# Patient Record
Sex: MALE | Race: OTHER
[De-identification: names, ages, dates, MRNs, and addresses within clinical notes are randomized per-mention and may not be internally consistent; named-entity substitution may affect disease eponyms.]

---

## 2018-01-18 ENCOUNTER — HOSPITAL ENCOUNTER (EMERGENCY)
Dept: HOSPITAL 72 - EMR | Age: 33
Discharge: HOME | End: 2018-01-18
Payer: SELF-PAY

## 2018-01-18 VITALS — WEIGHT: 150 LBS | HEIGHT: 65 IN | BODY MASS INDEX: 24.99 KG/M2

## 2018-01-18 VITALS — DIASTOLIC BLOOD PRESSURE: 82 MMHG | SYSTOLIC BLOOD PRESSURE: 132 MMHG

## 2018-01-18 VITALS — SYSTOLIC BLOOD PRESSURE: 132 MMHG | DIASTOLIC BLOOD PRESSURE: 82 MMHG

## 2018-01-18 DIAGNOSIS — J32.9: Primary | ICD-10-CM

## 2018-01-18 PROCEDURE — 99283 EMERGENCY DEPT VISIT LOW MDM: CPT

## 2018-01-19 NOTE — EMERGENCY ROOM REPORT
History of Present Illness


General


Chief Complaint:  Flu Like Symptoms


Source:  Patient





Present Illness


HPI


32-year-old male presents ED for evaluation.  States he has trouble breathing 

from his nose.  Also complaining of flulike symptoms for the last month.  

States his left nostril feels clogged.  Feels congested with sinus pressure.  

Denies any sore throat or earache.  States he feels feverish.  Afebrile at 

triage.  Denies sick contacts recent travel.  No other aggravating relieving 

factors.  Denies any other associated symptoms


Allergies:  


Coded Allergies:  


     No Known Allergies (Unverified , 1/18/18)





Patient History


Past Medical History:  none


Past Surgical History:  none


Pertinent Family History:  none


Social History:  Denies: smoking, alcohol use, drug use


Immunizations:  UTD


Reviewed Nursing Documentation:  PMH: Agreed, PSxH: Agreed





Nursing Documentation-PMH


Past Medical History:  No Stated History





Review of Systems


All Other Systems:  negative except mentioned in HPI





Physical Exam





Vital Signs








  Date Time  Temp Pulse Resp B/P (MAP) Pulse Ox O2 Delivery O2 Flow Rate FiO2


 


1/18/18 19:38 98.8 98 16 132/82 95 Room Air  


 


1/18/18 19:46        95








Sp02 EP Interpretation:  reviewed, normal


General Appearance:  no apparent distress, alert, GCS 15, non-toxic


Head:  normocephalic, atraumatic


Eyes:  bilateral eye normal inspection, bilateral eye PERRL


ENT:  hearing grossly normal, normal pharynx, no angioedema, normal voice, TMs 

+ canals normal, other - narrow nasal turbinates


Neck:  full range of motion, supple/symm/no masses


Respiratory:  chest non-tender, lungs clear, normal breath sounds, speaking 

full sentences


Cardiovascular #1:  regular rate, rhythm, no edema


Cardiovascular #2:  2+ carotid (R), 2+ carotid (L), 2+ radial (R), 2+ radial (L)

, 2+ dorsalis pedis (R), 2+ dorsalis pedis (L)


Gastrointestinal:  normal bowel sounds, non tender, soft, non-distended, no 

guarding, no rebound


Rectal:  deferred


Genitourinary:  normal inspection, no CVA tenderness


Musculoskeletal:  back normal, gait/station normal, normal range of motion, non-

tender


Neurologic:  alert, oriented x3, responsive, motor strength/tone normal, 

sensory intact, speech normal


Psychiatric:  judgement/insight normal, memory normal, mood/affect normal, no 

suicidal/homicidal ideation


Reflexes:  3+ bicep (R), 3+ bicep (L), 3+ tricep (R), 3+ tricep (L), 3+ knee (R)

, 3+ knee (L)


Skin:  normal color, no rash, warm/dry, well hydrated


Lymphatic:  no adenopathy





Medical Decision Making


Diagnostic Impression:  


 Primary Impression:  


 Sinusitis


 Qualified Codes:  J01.10 - Acute frontal sinusitis, unspecified


ER Course


Hospital Course 


32-year-old M presents to ED complaining of nasal congestion. bodyaches, chills 

x 1 month





Differential diagnoses include: URI, pharyngitis, otitis media, asthma 





Clinical course


Patient placed on stretcher.  After initial history, physical exam reveals a 

young male in no acute distress.  Bilateral TM unremarkable.  No pharyngeal 

erythema.  No tonsillar exudates. narrow nasal turbinates. No lymphadenopathy.  

lungs clear. abdomen soft.   Clinical findings consistent with sinusitits.  





Given one month course we will prescribe antibiotics.  Recommend Flonase





Diagnosis - sinusitits





Stable and discharged home with prescriptions for Flonase, Amoxicillin.  

Instructed to followup with PMD.  Return to ED if symptoms recur or worsen





Last Vital Signs








  Date Time  Temp Pulse Resp B/P (MAP) Pulse Ox O2 Delivery O2 Flow Rate FiO2


 


1/18/18 20:18 98.8 98 16 132/82 95 Room Air  95








Status:  improved


Disposition:  HOME, SELF-CARE


Condition:  Stable


Scripts


Amoxicillin* (AMOXIL*) 500 Mg Capsule


500 MG ORAL THREE TIMES A DAY, #21 CAP


   Prov: DARCY OLYOLA M.D.         1/18/18 


Fluticasone Propionate (Flonase Allergy Relief) 9.9 Ml [email or web address]


9.9 ML NS BID for 10 Days, #9.9 ML


   Prov: DARCY LOYOLA M.D.         1/18/18


Referrals:  


NOT CHOSEN IPA/MD,REFERRING (PCP)


Patient Instructions:  Sinusitis, Adult, Easy-to-Read











DARCY LOYOLA M.D. Jan 19, 2018 13:34